# Patient Record
Sex: MALE | URBAN - METROPOLITAN AREA
[De-identification: names, ages, dates, MRNs, and addresses within clinical notes are randomized per-mention and may not be internally consistent; named-entity substitution may affect disease eponyms.]

---

## 2022-10-13 ENCOUNTER — OFFICE VISIT (OUTPATIENT)
Dept: FAMILY MEDICINE CLINIC | Age: 22
End: 2022-10-13
Payer: COMMERCIAL

## 2022-10-13 ENCOUNTER — NURSE TRIAGE (OUTPATIENT)
Dept: OTHER | Facility: CLINIC | Age: 22
End: 2022-10-13

## 2022-10-13 VITALS
OXYGEN SATURATION: 97 % | RESPIRATION RATE: 14 BRPM | SYSTOLIC BLOOD PRESSURE: 130 MMHG | HEIGHT: 78 IN | TEMPERATURE: 97.9 F | BODY MASS INDEX: 24.83 KG/M2 | HEART RATE: 85 BPM | DIASTOLIC BLOOD PRESSURE: 79 MMHG | WEIGHT: 214.6 LBS

## 2022-10-13 DIAGNOSIS — R53.83 FATIGUE, UNSPECIFIED TYPE: ICD-10-CM

## 2022-10-13 DIAGNOSIS — R20.2 NUMBNESS AND TINGLING IN BOTH HANDS: Primary | ICD-10-CM
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
## 2022-10-13 DIAGNOSIS — R20.0 NUMBNESS AND TINGLING IN BOTH HANDS: Primary | ICD-10-CM

## 2022-10-13 DIAGNOSIS — R20.2 NUMBNESS AND TINGLING OF BOTH LOWER EXTREMITIES: ICD-10-CM

## 2022-10-13 DIAGNOSIS — R20.0 NUMBNESS AND TINGLING OF BOTH LOWER EXTREMITIES: ICD-10-CM

## 2022-10-13 PROCEDURE — 99202 OFFICE O/P NEW SF 15 MIN: CPT | Performed by: FAMILY MEDICINE

## 2022-10-13 NOTE — TELEPHONE ENCOUNTER
Location of patient: [place]    Received call from [facility] at [facility] with [employer]; Patient with Red Flag Complaint requesting to establish care with John Garcia. Subjective: Caller states \"Tingling in hands and feet. It has been going on for a long time but hasn't had insurance. He saw someone when this and chest pain started and said it was stress. No chest pain. \"     Current Symptoms: Tingling in hands and feet    Onset: 6 months ago;     Associated Symptoms: NA    Pain Severity: 0/10; Temperature:  Denies Fever    What has been tried: Nothing    LMP: NA Pregnant: NA    Recommended disposition: See PCP within 3 Days    Care advice provided, patient verbalizes understanding; denies any other questions or concerns; instructed to call back for any new or worsening symptoms. Patient/Caller agrees with recommended disposition; writer provided warm transfer to Jose Johnson at [facility] for appointment scheduling    Attention Provider: Thank you for allowing me to participate in the care of your patient. The patient was connected to triage in response to information provided to the [facility]. Please do not respond through this encounter as the response is not directed to a shared pool.         Reason for Disposition   Numbness or tingling in one or both hands is a chronic symptom (recurrent or ongoing problem lasting > 4 weeks)    Protocols used: Neurologic Deficit-ADULT-[place]

## 2022-10-13 NOTE — PROGRESS NOTES
Vahid Grant is a 20 y.o. male here today to address the following issues:  Chief Complaint   Patient presents with    Tingling     Patient stated its been on going for a year in his hands and feet and the numbness has progressed up his arms and legs comes and goes. Onset:2-3 years ago  Symptoms: Bilateral hand and feet tinging  Alleviating Factors:nothing  Aggravating Factors:nothing  Hx of frequent tick bites     Denies personal or family history of autoimmune disease. Also denies any personal or family history of thyroid disease. Does complain of some fatigue. No past medical history on file. No past surgical history on file. Social History     Socioeconomic History    Marital status: Not on file     Spouse name: Not on file    Number of children: Not on file    Years of education: Not on file    Highest education level: Not on file   Occupational History    Not on file   Tobacco Use    Smoking status: Not on file    Smokeless tobacco: Not on file   Substance and Sexual Activity    Alcohol use: Not on file    Drug use: Not on file    Sexual activity: Not on file   Other Topics Concern    Not on file   Social History Narrative    Not on file     Social Determinants of Health     Financial Resource Strain: Not on file   Food Insecurity: Not on file   Transportation Needs: Not on file   Physical Activity: Not on file   Stress: Not on file   Social Connections: Not on file   Intimate Partner Violence: Not on file   Housing Stability: Not on file       Not on File    No current outpatient medications on file. No current facility-administered medications for this visit. ROS    Visit Vitals  /79 (BP 1 Location: Left upper arm, BP Patient Position: Sitting, BP Cuff Size: Adult)   Pulse 85   Temp 97.9 °F (36.6 °C)   Resp 14   Ht 6' 6\" (1.981 m)   Wt 214 lb 9.6 oz (97.3 kg)   SpO2 97%   BMI 24.80 kg/m²       Physical Exam  GEN: Well appearing. No apparent distress.   Responds to all questions appropriately. Lungs: No labored respirations. Talking in complete sentences without difficulty. Neuro/Vascular: No edema  Skin: No obvious rash or skin breakdown     No results found for this or any previous visit (from the past 12 hour(s)). 1. Numbness and tingling in both hands  - METABOLIC PANEL, COMPREHENSIVE; Future  - CBC WITH AUTOMATED DIFF; Future  - VITAMIN B12; Future  - TSH 3RD GENERATION; Future  - SED RATE (ESR); Future  - C REACTIVE PROTEIN, QT; Future  - LYME AB, IGG & IGM BY WB; Future    2. Numbness and tingling of both lower extremities  - METABOLIC PANEL, COMPREHENSIVE; Future  - CBC WITH AUTOMATED DIFF; Future  - VITAMIN B12; Future  - TSH 3RD GENERATION; Future  - SED RATE (ESR); Future  - C REACTIVE PROTEIN, QT; Future  - LYME AB, IGG & IGM BY WB; Future    3. Tick bite, unspecified site, initial encounter  - METABOLIC PANEL, COMPREHENSIVE; Future  - CBC WITH AUTOMATED DIFF; Future  - VITAMIN B12; Future  - TSH 3RD GENERATION; Future  - SED RATE (ESR); Future  - C REACTIVE PROTEIN, QT; Future  - LYME AB, IGG & IGM BY WB; Future    4. Fatigue, unspecified type  - METABOLIC PANEL, COMPREHENSIVE; Future  - CBC WITH AUTOMATED DIFF; Future  - VITAMIN B12; Future  - TSH 3RD GENERATION; Future  - SED RATE (ESR); Future  - C REACTIVE PROTEIN, QT; Future  - LYME AB, IGG & IGM BY WB; Future    We will get labs. Can hold off until his annual wellness if he wants or can make a lab appointment for this. We need to establish care as this is his first visit to our clinic. Follow-up and Dispositions    Return in about 2 weeks (around 10/27/2022) for Annual Wellness with green team resident .            Darin Roa MD, FAAFP, CAQSM, RMSK

## 2022-10-28 ENCOUNTER — OFFICE VISIT (OUTPATIENT)
Dept: FAMILY MEDICINE CLINIC | Age: 22
End: 2022-10-28
Payer: COMMERCIAL

## 2022-10-28 VITALS
RESPIRATION RATE: 16 BRPM | DIASTOLIC BLOOD PRESSURE: 77 MMHG | BODY MASS INDEX: 25.45 KG/M2 | OXYGEN SATURATION: 98 % | HEIGHT: 78 IN | HEART RATE: 99 BPM | SYSTOLIC BLOOD PRESSURE: 146 MMHG | WEIGHT: 220 LBS

## 2022-10-28 DIAGNOSIS — R20.2 NUMBNESS AND TINGLING IN BOTH HANDS: ICD-10-CM

## 2022-10-28 DIAGNOSIS — Z00.00 ANNUAL PHYSICAL EXAM: Primary | ICD-10-CM
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
## 2022-10-28 DIAGNOSIS — R20.0 NUMBNESS AND TINGLING OF BOTH LOWER EXTREMITIES: ICD-10-CM

## 2022-10-28 DIAGNOSIS — R20.0 NUMBNESS AND TINGLING IN BOTH HANDS: ICD-10-CM

## 2022-10-28 DIAGNOSIS — R20.2 NUMBNESS AND TINGLING OF BOTH LOWER EXTREMITIES: ICD-10-CM

## 2022-10-28 DIAGNOSIS — R53.83 FATIGUE, UNSPECIFIED TYPE: ICD-10-CM

## 2022-10-28 DIAGNOSIS — F41.9 ANXIETY: ICD-10-CM

## 2022-10-28 PROCEDURE — 99395 PREV VISIT EST AGE 18-39: CPT

## 2022-10-28 RX ORDER — SERTRALINE HYDROCHLORIDE 25 MG/1
25 TABLET, FILM COATED ORAL DAILY
Qty: 30 TABLET | Refills: 0 | Status: SHIPPED | OUTPATIENT
Start: 2022-10-28 | End: 2022-11-25

## 2022-10-28 NOTE — PROGRESS NOTES
Bhavna Webb is a 20 y.o. male    Chief Complaint   Patient presents with    Complete Physical       1. Have you been to the ER, urgent care clinic since your last visit? Hospitalized since your last visit? No  2. Have you seen or consulted any other health care providers outside of the [address] [place] [address] since your last visit? Include any pap smears or colon screening. No    Visit Vitals  BP (!) 146/77 (BP 1 Location: Left upper arm, BP Patient Position: Sitting)   Pulse 99   Resp 16   Ht 6' 6\" (1.981 m)   Wt 220 lb (99.8 kg)   SpO2 98%   BMI 25.42 kg/m²     No flowsheet data found.   Health Maintenance Due   Topic Date Due    Hepatitis C Screening  Never done    Depression Screen  Never done    COVID-19 Vaccine (1) Never done    DTaP/Tdap/Td series (1 - Tdap) Never done    Flu Vaccine (1) Never done

## 2022-10-28 NOTE — PROGRESS NOTES
Subjective:   Leonarda Gaffney is an 20 y.o. male who presents for complete physical exam.    Patient was seen two weeks ago due to numbness and tingling which the patient has been experiencing in his hands and feet since past 2 years. Patient denies any muscle weakness, muscle pain, any rash, fever/chills. Patient also endorses pain in his knees that comes and goes. Patient says he has a tendency for his lower extremities to twitch sometimes. Patient has also noticed his short term memory has been worse since the past 6 months to 1 year ago. 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 Patient says his symptoms does not affect his work. Patient mentions he is very anxious about getting his lab work done. Patient was seen 2 years ago for these symptoms where they suspected Lyme disease but held off on labs as patient has a fear of getting his blood drawn. No fear of needles as patient has gotten tattoos. Diet: frozen breakfast burritos, frozen food, ground beef, pasta   Exercise: stays active at work    Immunizations - reviewed: There is no immunization history on file for this patient. Flu: Deferred  Tdap: Patient received it 4-5 years ago    Health Maintenance reviewed -  HIV testing: today  Hepatitis C testing: Due today    Allergies - reviewed:   No Known Allergies      Medications - reviewed:  Current Outpatient Medications   Medication Sig    sertraline (ZOLOFT) 25 mg tablet Take 1 Tablet by mouth daily. Indications: repeated episodes of anxiety     No current facility-administered medications for this visit. Past Medical History - reviewed:  No past medical history on file. Past Surgical History - reviewed:  No past surgical history on file. Family History - reviewed:  No family history on file.       Social History - reviewed:  Social History     Socioeconomic History    Marital status: SINGLE     Spouse name: Not on file    Number of children: Not on file    Years of education: Not on file    Highest education level: Not on file   Occupational History    Not on file   Tobacco Use    Smoking status: Not on file    Smokeless tobacco: Not on file   Substance and Sexual Activity    Alcohol use: Not on file    Drug use: Not on file    Sexual activity: Not on file   Other Topics Concern    Not on file   Social History Narrative    Not on file     Social Determinants of Health     Financial Resource Strain: Not on file   Food Insecurity: Not on file   Transportation Needs: Not on file   Physical Activity: Not on file   Stress: Not on file   Social Connections: Not on file   Intimate Partner Violence: Not on file   Housing Stability: Not on file         Review of Systems   CONSTITUTIONAL: Denies: fever, chills  PSYCH: Patient believes he may have anxiety      Objective:   Visit Vitals  BP (!) (P) 146/77 (BP 1 Location: Left upper arm, BP Patient Position: Sitting) Comment: pt very nervous about labwork   Pulse 99   Resp 16   Ht 6' 6\" (1.981 m)   Wt 220 lb (99.8 kg)   SpO2 98%   BMI 25.42 kg/m²       General appearance - alert, well appearing  Eyes - sclera anicteric and without injection   Chest - clear to auscultation  Heart - normal rate, regular rhythm, normal S1, S2, no murmurs  Abdomen - soft, nontender, nondistended, no masses or organomegaly  Neurological - alert, oriented, normal speech, no issues with sensation in the upper and lower extremities, myoclonus noted mainly in the LLE  Musculoskeletal - no joint tenderness, deformity or swelling  Extremities - no pedal edema noted  Skin - normal coloration and turgor, no rashes, no suspicious skin lesions noted      Assessment:       ICD-10-CM ICD-9-CM    1. Annual physical exam  Z00.00 V70.0 HEPATITIS C AB      HIV 1/2 AG/AB, 4TH GENERATION,W RFLX CONFIRM      HIV 1/2 AG/AB, 4TH GENERATION,W RFLX CONFIRM      HEPATITIS C AB      2.  Numbness and tingling in both hands R20.0 782.0 LYME AB, IGG & IGM BY WB    R20.2  C REACTIVE PROTEIN, QT      SED RATE (ESR)      TSH 3RD GENERATION      VITAMIN B12      CBC WITH AUTOMATED DIFF      METABOLIC PANEL, COMPREHENSIVE      3. Numbness and tingling of both lower extremities  R20.0 782.0 LYME AB, IGG & IGM BY WB    R20.2  C REACTIVE PROTEIN, QT      SED RATE (ESR)      TSH 3RD GENERATION      VITAMIN B12      CBC WITH AUTOMATED DIFF      METABOLIC PANEL, COMPREHENSIVE      4. Tick bite, unspecified site, initial encounter  W57. XXXA 919.4 LYME AB, IGG & IGM BY WB     E906.4 C REACTIVE PROTEIN, QT      SED RATE (ESR)      TSH 3RD GENERATION      VITAMIN B12      CBC WITH AUTOMATED DIFF      METABOLIC PANEL, COMPREHENSIVE      5. Fatigue, unspecified type  R53.83 780.79 LYME AB, IGG & IGM BY WB      C REACTIVE PROTEIN, QT      SED RATE (ESR)      TSH 3RD GENERATION      VITAMIN B12      CBC WITH AUTOMATED DIFF      METABOLIC PANEL, COMPREHENSIVE      6. Anxiety  F41.9 300.00 sertraline (ZOLOFT) 25 mg tablet              Plan:   Dio Arellano is a 20year old male with PMHx of depression who presents for an annual physical exam.    1. Annual physical exam  - HIV 1/2 AG/AB, 4TH GENERATION,W RFLX CONFIRM  - HEPATITIS C AB    2. Numbness and tingling in both hands and bilateral LE  - LYME AB, IGG & IGM BY WB  - C REACTIVE PROTEIN, QT  - SED RATE (ESR)  - TSH 3RD GENERATION  - VITAMIN B12  - CBC WITH AUTOMATED DIFF  - METABOLIC PANEL, COMPREHENSIVE    3. Tick bite, unspecified site, initial encounter  - LYME AB, IGG & IGM BY WB  - C REACTIVE PROTEIN, QT  - SED RATE (ESR)  - TSH 3RD GENERATION  - VITAMIN B12  - CBC WITH AUTOMATED DIFF  - METABOLIC PANEL, COMPREHENSIVE    4. Fatigue, unspecified type  - LYME AB, IGG & IGM BY WB  - C REACTIVE PROTEIN, QT  - SED RATE (ESR)  - TSH 3RD GENERATION  - VITAMIN B12  - CBC WITH AUTOMATED DIFF  - METABOLIC PANEL, COMPREHENSIVE    5. Anxiety: SEBASTIAN-7 score: 9. PHQ9 score: 9.  History of depression when patient was 14years old. Patient anxious about getting blood drawn for labs with one episode of emesis. - sertraline (ZOLOFT) 25 mg tablet; Take 1 Tablet by mouth daily. Indications: repeated episodes of anxiety  Dispense: 30 Tablet; Refill: 0   - Follow up in 2 weeks    6. Elevated blood pressure: BP today: 146/77. Likely due to anxiety about getting blood drawn for labs vs white coat HTN as patient had normal BP on 10/13 clinic visit. - Follow up on BP at next visit in 2 weeks. I have discussed the diagnosis with the patient and the intended plan as seen in the above orders. The patient has received an after-visit summary and questions were answered concerning future plans. I have discussed medication side effects and warnings with the patient as well. Informed pt to return to the office if new symptoms arise.     Patient discussed with Dr. Omid Silver (Attending)     Judy Phelps MD  Family Medicine Resident (PGY-1)

## 2022-11-02 LAB
ALBUMIN SERPL-MCNC: 5.3 G/DL (ref 4.1–5.2)
ALBUMIN/GLOB SERPL: 2 {RATIO} (ref 1.2–2.2)
ALP SERPL-CCNC: 98 IU/L (ref 44–121)
ALT SERPL-CCNC: 24 IU/L (ref 0–44)
AST SERPL-CCNC: 24 IU/L (ref 0–40)
B BURGDOR IGG PATRN SER IB-IMP: NEGATIVE
B BURGDOR IGM PATRN SER IB-IMP: NEGATIVE
B BURGDOR18KD IGG SER QL IB: ABNORMAL
B BURGDOR23KD IGG SER QL IB: ABNORMAL
B BURGDOR23KD IGM SER QL IB: PRESENT
B BURGDOR28KD IGG SER QL IB: PRESENT
B BURGDOR30KD IGG SER QL IB: ABNORMAL
B BURGDOR39KD IGG SER QL IB: PRESENT
B BURGDOR39KD IGM SER QL IB: ABNORMAL
B BURGDOR41KD IGG SER QL IB: ABNORMAL
B BURGDOR41KD IGM SER QL IB: ABNORMAL
B BURGDOR45KD IGG SER QL IB: ABNORMAL
B BURGDOR58KD IGG SER QL IB: PRESENT
B BURGDOR66KD IGG SER QL IB: ABNORMAL
B BURGDOR93KD IGG SER QL IB: ABNORMAL
BASOPHILS # BLD AUTO: 0 X10E3/UL (ref 0–0.2)
BASOPHILS NFR BLD AUTO: 0 %
BILIRUB SERPL-MCNC: 1.1 MG/DL (ref 0–1.2)
BUN SERPL-MCNC: 9 MG/DL (ref 6–20)
BUN/CREAT SERPL: 8 (ref 9–20)
CALCIUM SERPL-MCNC: 10.2 MG/DL (ref 8.7–10.2)
CHLORIDE SERPL-SCNC: 100 MMOL/L (ref 96–106)
CO2 SERPL-SCNC: 24 MMOL/L (ref 20–29)
CREAT SERPL-MCNC: 1.1 MG/DL (ref 0.76–1.27)
CRP SERPL-MCNC: 1 MG/L (ref 0–10)
EGFR: 97 ML/MIN/1.73
EOSINOPHIL # BLD AUTO: 0.1 X10E3/UL (ref 0–0.4)
EOSINOPHIL NFR BLD AUTO: 1 %
ERYTHROCYTE [DISTWIDTH] IN BLOOD BY AUTOMATED COUNT: 12.5 % (ref 11.6–15.4)
ERYTHROCYTE [SEDIMENTATION RATE] IN BLOOD BY WESTERGREN METHOD: 2 MM/HR (ref 0–15)
GLOBULIN SER CALC-MCNC: 2.6 G/DL (ref 1.5–4.5)
GLUCOSE SERPL-MCNC: 89 MG/DL (ref 70–99)
HCT VFR BLD AUTO: 46.4 % (ref 37.5–51)
HCV AB S/CO SERPL IA: <0.1 S/CO RATIO (ref 0–0.9)
HGB BLD-MCNC: 15.7 G/DL (ref 13–17.7)
HIV 1+2 AB+HIV1 P24 AG SERPL QL IA: NON REACTIVE
IMM GRANULOCYTES # BLD AUTO: 0 X10E3/UL (ref 0–0.1)
IMM GRANULOCYTES NFR BLD AUTO: 0 %
LYMPHOCYTES # BLD AUTO: 1.8 X10E3/UL (ref 0.7–3.1)
LYMPHOCYTES NFR BLD AUTO: 27 %
MCH RBC QN AUTO: 28.8 PG (ref 26.6–33)
MCHC RBC AUTO-ENTMCNC: 33.8 G/DL (ref 31.5–35.7)
MCV RBC AUTO: 85 FL (ref 79–97)
MONOCYTES # BLD AUTO: 0.6 X10E3/UL (ref 0.1–0.9)
MONOCYTES NFR BLD AUTO: 9 %
NEUTROPHILS # BLD AUTO: 4.2 X10E3/UL (ref 1.4–7)
NEUTROPHILS NFR BLD AUTO: 63 %
PLATELET # BLD AUTO: 184 X10E3/UL (ref 150–450)
POTASSIUM SERPL-SCNC: 4 MMOL/L (ref 3.5–5.2)
PROT SERPL-MCNC: 7.9 G/DL (ref 6–8.5)
RBC # BLD AUTO: 5.45 X10E6/UL (ref 4.14–5.8)
SODIUM SERPL-SCNC: 139 MMOL/L (ref 134–144)
TSH SERPL DL<=0.005 MIU/L-ACNC: 4.18 UIU/ML (ref 0.45–4.5)
VIT B12 SERPL-MCNC: 347 PG/ML (ref 232–1245)
WBC # BLD AUTO: 6.7 X10E3/UL (ref 3.4–10.8)
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
## 2022-11-08 RX ORDER — DOXYCYCLINE 100 MG/1
100 CAPSULE ORAL 2 TIMES DAILY
Qty: 56 CAPSULE | Refills: 0 | Status: SHIPPED | OUTPATIENT
Start: 2022-11-08 | End: 2022-12-06

## 2022-11-08 NOTE — PROGRESS NOTES
Called and talked to the patient about starting Doxycycline 100 mg BID for 28 days for suspected Lyme disease with neurologic findings though Western Blot negative. I have informed patient of the same. Patient will need to be seen after finishing the course of antibiotics to see whether it has improved his symptoms. If not, will need to look into other causes.

## 2022-11-15 ENCOUNTER — OFFICE VISIT (OUTPATIENT)
Dept: FAMILY MEDICINE CLINIC | Age: 22
End: 2022-11-15
Payer: COMMERCIAL

## 2022-11-15 VITALS
BODY MASS INDEX: 25.19 KG/M2 | DIASTOLIC BLOOD PRESSURE: 85 MMHG | SYSTOLIC BLOOD PRESSURE: 135 MMHG | WEIGHT: 218 LBS | HEART RATE: 73 BPM | OXYGEN SATURATION: 99 %

## 2022-11-15 DIAGNOSIS — F41.9 ANXIETY: Primary | ICD-10-CM

## 2022-11-15 DIAGNOSIS — R03.0 ELEVATED BP WITHOUT DIAGNOSIS OF HYPERTENSION: ICD-10-CM

## 2022-11-15 NOTE — PROGRESS NOTES
Chief Complaint   Patient presents with    Medication Evaluation     Check on anxiety meds     Subjective   Breanna Hugo is an 20 y.o. male who presents for anxiety follow up. Recently started on Zoloft 25mg. This is working okay for him. Some days are better than others. No side effects from medication. Not interested in therapy at this time. SEBASTIAN 7 score 3 today    Triggers: going to public places, talking to people he doesn't know. No recent panic attacks     Unsure of family hx of HTN. BP elevated again today. Patient says it is always elevated at doctor's offices. Has BP cuff at home. Recent lyme dx, being treated with doxycycline. Has some persistent numbness/tingling. No recent fevers/chills, n/v/c/d, abd pain, vision changes or facial droop. Review of Systems   Comprehensive review of systems was negative except what is mentioned in HPI. Allergies   No Known Allergies    Medications  Current Outpatient Medications   Medication Sig    sertraline (ZOLOFT) 25 mg tablet Take 1 Tablet by mouth daily. Indications: repeated episodes of anxiety    doxycycline (VIBRAMYCIN) 100 mg capsule Take 1 Capsule by mouth two (2) times a day for 28 days. Indications: Lyme disease with late neurological findings (Patient not taking: Reported on 11/15/2022)     No current facility-administered medications for this visit. Medical History  Past Medical History:   Diagnosis Date    Anxiety        Immunizations     There is no immunization history on file for this patient. Objective   Vital Signs  Visit Vitals  /85   Pulse 73   Wt 218 lb (98.9 kg)   SpO2 99%   BMI 25.19 kg/m²       Physical Examination  Physical Exam  Constitutional:       Appearance: Normal appearance. Cardiovascular:      Rate and Rhythm: Normal rate and regular rhythm. Pulses: Normal pulses. Heart sounds: Normal heart sounds. Pulmonary:      Effort: Pulmonary effort is normal.      Breath sounds: Normal breath sounds. Musculoskeletal:      Cervical back: Normal range of motion and neck supple. Neurological:      Mental Status: He is alert. Comments: Twitching noted        Assessment and Plan   Leonarda Gulshan is a 20 y.o. who presents for anxiety follow up    1. Anxiety  Continue Zoloft 25mg daily. Re-evaluate in 4wk to see if dose increase is needed. Currently symptoms well controlled. SEBASTIAN 7 score 3. No SI/HI. 2. Elevated BP without diagnosis of hypertension  BP initially 145/83, improved to 135/85 on repeat. Patient advised to maintain BP log and follow up in 1 week. May need further workup with aldosterone/renin, urine metanephrines, lipid panel if home BP also elevated. Follow-up and Dispositions    Return in about 1 week (around 11/22/2022) for BP follow up (virtual visit). I have discussed the aforementioned diagnoses and plan with the patient in detail. I have provided information in person and/or in AVS. All questions answered prior to discharge.       I discussed this patient with Dr. Gilda Nevarez (Attending Physician)   Signed By:  Igor Schultz DO    Family Medicine Resident

## 2022-11-15 NOTE — PROGRESS NOTES
Chief Complaint   Patient presents with    Medication Evaluation     Check on anxiety meds     Visit Vitals  BP (!) 145/83 (BP 1 Location: Right arm, BP Patient Position: Sitting, BP Cuff Size: Adult long)   Pulse (!) 114   Wt 218 lb (98.9 kg)   SpO2 99%   BMI 25.19 kg/m²

## 2022-11-22 ENCOUNTER — TELEPHONE (OUTPATIENT)
Dept: FAMILY MEDICINE CLINIC | Age: 22
End: 2022-11-22

## 2022-11-22 NOTE — TELEPHONE ENCOUNTER
Multiple attempts made to contact pt for their VV scheduled on 11/22 at 3:30pm including via doxy. me link and via telephone w/ no response. LVM advising pt to call and reschedule at their earliest convenience if desired.     Ness Hummel MD

## 2022-11-25 DIAGNOSIS — F41.9 ANXIETY: ICD-10-CM

## 2022-11-25 RX ORDER — SERTRALINE HYDROCHLORIDE 25 MG/1
25 TABLET, FILM COATED ORAL DAILY
Qty: 30 TABLET | Refills: 0 | Status: SHIPPED | OUTPATIENT
Start: 2022-11-25

## 2022-12-09 ENCOUNTER — OFFICE VISIT (OUTPATIENT)
Dept: FAMILY MEDICINE CLINIC | Age: 22
End: 2022-12-09
Payer: COMMERCIAL

## 2022-12-09 VITALS
OXYGEN SATURATION: 98 % | HEIGHT: 78 IN | RESPIRATION RATE: 14 BRPM | SYSTOLIC BLOOD PRESSURE: 133 MMHG | HEART RATE: 82 BPM | TEMPERATURE: 97.6 F | DIASTOLIC BLOOD PRESSURE: 84 MMHG | BODY MASS INDEX: 25.2 KG/M2 | WEIGHT: 217.8 LBS

## 2022-12-09 DIAGNOSIS — R03.0 ELEVATED BP WITHOUT DIAGNOSIS OF HYPERTENSION: ICD-10-CM

## 2022-12-09 DIAGNOSIS — F41.9 ANXIETY: Primary | ICD-10-CM
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
## 2022-12-09 RX ORDER — DOXYCYCLINE 100 MG/1
100 CAPSULE ORAL 2 TIMES DAILY
COMMUNITY

## 2022-12-09 RX ORDER — SERTRALINE HYDROCHLORIDE 25 MG/1
50 TABLET, FILM COATED ORAL DAILY
Qty: 60 TABLET | Refills: 2 | Status: SHIPPED | OUTPATIENT
Start: 2022-12-09

## 2022-12-09 NOTE — PROGRESS NOTES
Terrance Fox is a 20 y.o. male    Chief Complaint   Patient presents with    Medication Evaluation       1. Have you been to the ER, urgent care clinic since your last visit? Hospitalized since your last visit? No  2. Have you seen or consulted any other health care providers outside of the [address] [place] [address] since your last visit? Include any pap smears or colon screening. No    There were no vitals taken for this visit.   3 most recent PHQ Screens 12/9/2022   Little interest or pleasure in doing things Not at all   Feeling down, depressed, irritable, or hopeless Not at all   Total Score PHQ 2 0   Trouble falling or staying asleep, or sleeping too much -   Feeling tired or having little energy -   Poor appetite, weight loss, or overeating -   Feeling bad about yourself - or that you are a failure or have let yourself or your family down -   Trouble concentrating on things such as school, work, reading, or watching TV -   Moving or speaking so slowly that other people could have noticed; or the opposite being so fidgety that others notice -   Thoughts of being better off dead, or hurting yourself in some way -   PHQ 9 Score -   How difficult have these problems made it for you to do your work, take care of your home and get along with others -     Health Maintenance Due   Topic Date Due    COVID-19 Vaccine (1) Never done    DTaP/Tdap/Td series (1 - Tdap) Never done

## 2022-12-09 NOTE — PROGRESS NOTES
[facility] [address] [place] [address]   Office [phone], Fax [phone]    Subjective:     Chief Complaint   Patient presents with    Medication Evaluation       HPI:  Toño Clark is a 20 y.o. male with a history of Anxiety that presents for: follow up for Anxiety and positive lyme initial testing    Lyme Disease:   - symptoms initially noted with peripheral neuropathy, initial testing positive however western blot negative. - treated with Doxy x28 days for concern for neurologic symptoms  - feels neuropathy is improving since last visit     Anxiety:   - Prescribed Zoloft 25mg  -Feels like his symptoms generally have been slightly worsening  -Denies any SI or HI        Health Maintenance:  Health Maintenance Due   Topic Date Due    COVID-19 Vaccine (1) Never done    DTaP/Tdap/Td series (1 - Tdap) Never done          Past Medical Hx  I personally reviewed. Past Medical History:   Diagnosis Date    Anxiety         SocHx   I personally reviewed. Social History     Socioeconomic History    Marital status: SINGLE     Spouse name: Not on file    Number of children: Not on file    Years of education: Not on file    Highest education level: Not on file   Occupational History    Not on file   Tobacco Use    Smoking status: Never    Smokeless tobacco: Never   Substance and Sexual Activity    Alcohol use: Not Currently    Drug use: Never    Sexual activity: Not on file   Other Topics Concern    Not on file   Social History Narrative    Not on file     Social Determinants of Health     Financial Resource Strain: Not on file   Food Insecurity: Not on file   Transportation Needs: Not on file   Physical Activity: Not on file   Stress: Not on file   Social Connections: Not on file   Intimate Partner Violence: Not on file   Housing Stability: Not on file        Allergies  I personally reviewed. No Known Allergies     Medications  I personally reviewed.   Current Outpatient Medications on File Prior to Visit Medication Sig Dispense Refill    doxycycline (MONODOX) 100 mg capsule Take 100 mg by mouth two (2) times a day. No current facility-administered medications on file prior to visit. ROS: as listed in HPI        Objective:   Vitals  I personally reviewed. Visit Vitals  /84 (BP 1 Location: Right arm, BP Patient Position: Sitting, BP Cuff Size: Large adult)   Pulse 82   Temp 97.6 °F (36.4 °C)   Resp 14   Ht 6' 6\" (1.981 m)   Wt 217 lb 12.8 oz (98.8 kg)   SpO2 98%   BMI 25.17 kg/m²        Physical Exam:   Physical Exam  Constitutional:       Appearance: Normal appearance. HENT:      Head: Normocephalic and atraumatic. Right Ear: External ear normal.      Left Ear: External ear normal.   Cardiovascular:      Rate and Rhythm: Normal rate and regular rhythm. Pulses: Normal pulses. Heart sounds: Normal heart sounds. No murmur heard. Pulmonary:      Effort: Pulmonary effort is normal. No respiratory distress. Breath sounds: Normal breath sounds. No stridor. No wheezing. Abdominal:      General: Abdomen is flat. Palpations: Abdomen is soft. Tenderness: There is no abdominal tenderness. Skin:     General: Skin is warm and dry. Findings: No rash. Neurological:      General: No focal deficit present. Mental Status: He is alert and oriented to person, place, and time. GAD7 Score: 6 today   Assessment/Plan:       76-year-old male with history of anxiety comes in today to follow-up for increased anxiety as well as recent treatment for positive Lyme testing. Patient has completed 28 days of doxycycline with peripheral neuropathy slightly improving at this time. EKG performed today for baseline cardiac, noted to be normal.    Patient notes increasing anxiety, with no real improvement from 25 mg of Zoloft. Get score of 6 today. Will increase Zoloft dose to 50 daily and follow-up in 1 month. Diagnoses and all orders for this visit:    1. Anxiety    2. Elevated BP without diagnosis of hypertension    3. Tick bite, unspecified site, initial encounter       Follow-up and Dispositions    Return in about 4 weeks (around 1/6/2023) for Follow Up Anxiety, Increased Zoloft . Pt was discussed with Dr Jerzy Carlos (attending physician). I have reviewed patient medical and social history and medications. I have reviewed pertinent labs results and other data. I have discussed the diagnosis with the patient and the intended plan as seen in the above orders. The patient has received an after-visit summary and questions were answered concerning future plans. I have discussed medication side effects and warnings with the patient as well.     Katelynn Contreras MD  Resident TELECARE [facility]

## 2023-01-03 ENCOUNTER — OFFICE VISIT (OUTPATIENT)
Dept: FAMILY MEDICINE CLINIC | Age: 23
End: 2023-01-03
Payer: COMMERCIAL

## 2023-01-03 VITALS
HEIGHT: 78 IN | DIASTOLIC BLOOD PRESSURE: 75 MMHG | OXYGEN SATURATION: 96 % | SYSTOLIC BLOOD PRESSURE: 135 MMHG | BODY MASS INDEX: 24.99 KG/M2 | RESPIRATION RATE: 17 BRPM | HEART RATE: 99 BPM | TEMPERATURE: 98 F | WEIGHT: 216 LBS

## 2023-01-03 DIAGNOSIS — F41.9 ANXIETY: Primary | ICD-10-CM

## 2023-01-03 RX ORDER — SERTRALINE HYDROCHLORIDE 50 MG/1
50 TABLET, FILM COATED ORAL DAILY
Qty: 60 TABLET | Refills: 1 | Status: SHIPPED | OUTPATIENT
Start: 2023-01-03

## 2023-01-03 NOTE — LETTER
NOTIFICATION RETURN TO WORK / SCHOOL    1/3/2023 3:17 PM    Mr. Fior Butts  [id number]      To Whom It May Concern:    Fior Butts is currently under the care of [id number] [email or web address]. He will return to work  on: 1/4/22    If there are questions or concerns please have the patient contact our office.         Sincerely,      Екатерина Reyes MD

## 2023-01-03 NOTE — PROGRESS NOTES
[address] [facility] [address] [place] [address]   Office [phone], Fax [phone]    Subjective:     Chief Complaint   Patient presents with    Anxiety       HPI:  Jesus Galloway is a 20 y.o. male with a history of anxiety that presents for: follow up increasing Zoloft     Anxiety:   Recently increased Zoloft to 50mg daily as patient was complaining of no improvement in symptoms at last visit. GAD7 score of 6 at that time. Today, after medication increase, patient states significant improvement in symptoms. States he he sleeping better and has less general anxiety. No panic attacks. Denies any side effects. Health Maintenance:  Health Maintenance Due   Topic Date Due    COVID-19 Vaccine (1) Never done    DTaP/Tdap/Td series (1 - Tdap) Never done          Past Medical Hx  I personally reviewed. Past Medical History:   Diagnosis Date    Anxiety         SocHx   I personally reviewed. Social History     Socioeconomic History    Marital status: SINGLE     Spouse name: Not on file    Number of children: Not on file    Years of education: Not on file    Highest education level: Not on file   Occupational History    Not on file   Tobacco Use    Smoking status: Never    Smokeless tobacco: Never   Substance and Sexual Activity    Alcohol use: Not Currently    Drug use: Never    Sexual activity: Not on file   Other Topics Concern    Not on file   Social History Narrative    Not on file     Social Determinants of Health     Financial Resource Strain: Not on file   Food Insecurity: Not on file   Transportation Needs: Not on file   Physical Activity: Not on file   Stress: Not on file   Social Connections: Not on file   Intimate Partner Violence: Not on file   Housing Stability: Not on file        Allergies  I personally reviewed. No Known Allergies     Medications  I personally reviewed.   Current Outpatient Medications on File Prior to Visit   Medication Sig Dispense Refill    sertraline (ZOLOFT) 25 mg tablet Take 2 Tablets by mouth daily. Indications: repeated episodes of anxiety 60 Tablet 2    doxycycline (MONODOX) 100 mg capsule Take 100 mg by mouth two (2) times a day. (Patient not taking: Reported on 1/3/2023)       No current facility-administered medications on file prior to visit. ROS:   Review of Systems   Neurological:  Negative for dizziness. Psychiatric/Behavioral:  Negative for depression, substance abuse and suicidal ideas. The patient is nervous/anxious. Objective:   Vitals  I personally reviewed. Visit Vitals  /75   Pulse 99   Temp 98 °F (36.7 °C) (Temporal)   Resp 17   Ht 6' 6\" (1.981 m)   Wt 216 lb (98 kg)   SpO2 96%   BMI 24.96 kg/m²        Physical Exam:   Physical Exam  Constitutional:       Appearance: Normal appearance. Cardiovascular:      Rate and Rhythm: Normal rate and regular rhythm. Pulses: Normal pulses. Heart sounds: Normal heart sounds. Pulmonary:      Effort: Pulmonary effort is normal. No respiratory distress. Breath sounds: Normal breath sounds. No wheezing. Neurological:      General: No focal deficit present. Mental Status: He is alert and oriented to person, place, and time. Psychiatric:         Mood and Affect: Mood normal.         Behavior: Behavior normal.        GAD7 Score - 1     Notable Labs and Imaging:   None    Assessment/Plan:   Assessment:   20yo male following up for anxiety, improved after increasing Zoloft last month. 1. Anxiety  Symptoms significantly improved per patient after increasing Zoloft from 25mg to 50mg daily. GAD7 Score reduced to 1 today from previous 6. Patient is not interested in therapy at this time. May consider at next visit   Follow up in 3 months for general anxiety. Pt was discussed with Dr Hallie Mg (attending physician). I have reviewed patient medical and social history and medications. I have reviewed pertinent labs results and other data.  I have discussed the diagnosis with the patient and the intended plan as seen in the above orders. The patient has received an after-visit summary and questions were answered concerning future plans. I have discussed medication side effects and warnings with the patient as well.     Esther Lopez MD  Resident [place] [id number]

## 2023-01-03 NOTE — PROGRESS NOTES
Chief Complaint   Patient presents with    Anxiety     1. Have you been to the ER, urgent care clinic since your last visit? Hospitalized since your last visit? No    2. Have you seen or consulted any other health care providers outside of the [address] [place] [address] since your last visit? Include any pap smears or colon screening.  No

## 2023-01-09 ENCOUNTER — APPOINTMENT (OUTPATIENT)
Dept: GENERAL RADIOLOGY | Age: 23
End: 2023-01-09
Attending: STUDENT IN AN ORGANIZED HEALTH CARE EDUCATION/TRAINING PROGRAM
Payer: COMMERCIAL

## 2023-01-09 ENCOUNTER — HOSPITAL ENCOUNTER (EMERGENCY)
Age: 23
Discharge: HOME | End: 2023-01-09
Attending: STUDENT IN AN ORGANIZED HEALTH CARE EDUCATION/TRAINING PROGRAM
Payer: COMMERCIAL

## 2023-01-09 VITALS
BODY MASS INDEX: 24.99 KG/M2 | WEIGHT: 216 LBS | HEIGHT: 78 IN | TEMPERATURE: 98.5 F | OXYGEN SATURATION: 99 % | DIASTOLIC BLOOD PRESSURE: 83 MMHG | HEART RATE: 71 BPM | RESPIRATION RATE: 18 BRPM | SYSTOLIC BLOOD PRESSURE: 125 MMHG

## 2023-01-09 DIAGNOSIS — R07.89 CHEST WALL PAIN: Primary | ICD-10-CM

## 2023-01-09 LAB
ALBUMIN SERPL-MCNC: 4.3 G/DL (ref 3.5–5)
ALBUMIN/GLOB SERPL: 1.3 (ref 1.1–2.2)
ALP SERPL-CCNC: 82 U/L (ref 45–117)
ALT SERPL-CCNC: 37 U/L (ref 12–78)
ANION GAP SERPL CALC-SCNC: 6 MMOL/L (ref 5–15)
AST SERPL W P-5'-P-CCNC: 21 U/L (ref 15–37)
ATRIAL RATE: 122 BPM
BASOPHILS # BLD: 0 K/UL (ref 0–0.1)
BASOPHILS NFR BLD: 0 % (ref 0–1)
BILIRUB SERPL-MCNC: 1.6 MG/DL (ref 0.2–1)
BUN SERPL-MCNC: 9 MG/DL (ref 6–20)
BUN/CREAT SERPL: 8 (ref 12–20)
CA-I BLD-MCNC: 9.4 MG/DL (ref 8.5–10.1)
CALCULATED P AXIS, ECG09: 70 DEGREES
CALCULATED R AXIS, ECG10: 84 DEGREES
CALCULATED T AXIS, ECG11: 55 DEGREES
CHLORIDE SERPL-SCNC: 104 MMOL/L (ref 97–108)
CO2 SERPL-SCNC: 28 MMOL/L (ref 21–32)
CREAT SERPL-MCNC: 1.2 MG/DL (ref 0.7–1.3)
DIAGNOSIS, 93000: NORMAL
DIFFERENTIAL METHOD BLD: NORMAL
EOSINOPHIL # BLD: 0.1 K/UL (ref 0–0.4)
EOSINOPHIL NFR BLD: 1 % (ref 0–7)
ERYTHROCYTE [DISTWIDTH] IN BLOOD BY AUTOMATED COUNT: 11.6 % (ref 11.5–14.5)
GLOBULIN SER CALC-MCNC: 3.4 G/DL (ref 2–4)
GLUCOSE SERPL-MCNC: 119 MG/DL (ref 65–100)
HCT VFR BLD AUTO: 42.8 % (ref 36.6–50.3)
HGB BLD-MCNC: 14.9 G/DL (ref 12.1–17)
IMM GRANULOCYTES # BLD AUTO: 0 K/UL (ref 0–0.04)
IMM GRANULOCYTES NFR BLD AUTO: 0 % (ref 0–0.5)
LYMPHOCYTES # BLD: 2.5 K/UL (ref 0.8–3.5)
LYMPHOCYTES NFR BLD: 36 % (ref 12–49)
MCH RBC QN AUTO: 28.8 PG (ref 26–34)
MCHC RBC AUTO-ENTMCNC: 34.8 G/DL (ref 30–36.5)
MCV RBC AUTO: 82.8 FL (ref 80–99)
MONOCYTES # BLD: 0.6 K/UL (ref 0–1)
MONOCYTES NFR BLD: 8 % (ref 5–13)
NEUTS SEG # BLD: 3.6 K/UL (ref 1.8–8)
NEUTS SEG NFR BLD: 55 % (ref 32–75)
NRBC # BLD: 0 K/UL (ref 0–0.01)
NRBC BLD-RTO: 0 PER 100 WBC
P-R INTERVAL, ECG05: 146 MS
PLATELET # BLD AUTO: 174 K/UL (ref 150–400)
PMV BLD AUTO: 11.4 FL (ref 8.9–12.9)
POTASSIUM SERPL-SCNC: 3.1 MMOL/L (ref 3.5–5.1)
PROT SERPL-MCNC: 7.7 G/DL (ref 6.4–8.2)
Q-T INTERVAL, ECG07: 320 MS
QRS DURATION, ECG06: 114 MS
QTC CALCULATION (BEZET), ECG08: 456 MS
RBC # BLD AUTO: 5.17 M/UL (ref 4.1–5.7)
SODIUM SERPL-SCNC: 138 MMOL/L (ref 136–145)
TROPONIN-HIGH SENSITIVITY: 6 NG/L (ref 0–76)
TROPONIN-HIGH SENSITIVITY: 6 NG/L (ref 0–76)
VENTRICULAR RATE, ECG03: 122 BPM
WBC # BLD AUTO: 6.8 K/UL (ref 4.1–11.1)

## 2023-01-09 PROCEDURE — 84484 ASSAY OF TROPONIN QUANT: CPT

## 2023-01-09 PROCEDURE — 96375 TX/PRO/DX INJ NEW DRUG ADDON: CPT

## 2023-01-09 PROCEDURE — 85025 COMPLETE CBC W/AUTO DIFF WBC: CPT

## 2023-01-09 PROCEDURE — 80053 COMPREHEN METABOLIC PANEL: CPT

## 2023-01-09 PROCEDURE — 36415 COLL VENOUS BLD VENIPUNCTURE: CPT

## 2023-01-09 PROCEDURE — 93005 ELECTROCARDIOGRAM TRACING: CPT

## 2023-01-09 PROCEDURE — 96361 HYDRATE IV INFUSION ADD-ON: CPT

## 2023-01-09 PROCEDURE — 74011250637 HC RX REV CODE- 250/637: Performed by: STUDENT IN AN ORGANIZED HEALTH CARE EDUCATION/TRAINING PROGRAM

## 2023-01-09 PROCEDURE — 99285 EMERGENCY DEPT VISIT HI MDM: CPT

## 2023-01-09 PROCEDURE — 71045 X-RAY EXAM CHEST 1 VIEW: CPT

## 2023-01-09 PROCEDURE — 74011250636 HC RX REV CODE- 250/636: Performed by: STUDENT IN AN ORGANIZED HEALTH CARE EDUCATION/TRAINING PROGRAM

## 2023-01-09 PROCEDURE — 96374 THER/PROPH/DIAG INJ IV PUSH: CPT

## 2023-01-09 RX ORDER — KETOROLAC TROMETHAMINE 30 MG/ML
15 INJECTION, SOLUTION INTRAMUSCULAR; INTRAVENOUS
Status: COMPLETED | OUTPATIENT
Start: 2023-01-09 | End: 2023-01-09

## 2023-01-09 RX ORDER — ONDANSETRON 2 MG/ML
4 INJECTION INTRAMUSCULAR; INTRAVENOUS
Status: COMPLETED | OUTPATIENT
Start: 2023-01-09 | End: 2023-01-09

## 2023-01-09 RX ORDER — POTASSIUM CHLORIDE 1.5 G/1.77G
40 POWDER, FOR SOLUTION ORAL
Status: COMPLETED | OUTPATIENT
Start: 2023-01-09 | End: 2023-01-09

## 2023-01-09 RX ADMIN — ONDANSETRON 4 MG: 2 INJECTION INTRAMUSCULAR; INTRAVENOUS at 09:30

## 2023-01-09 RX ADMIN — SODIUM CHLORIDE 1000 ML: 9 INJECTION, SOLUTION INTRAVENOUS at 09:29

## 2023-01-09 RX ADMIN — KETOROLAC TROMETHAMINE 15 MG: 30 INJECTION, SOLUTION INTRAMUSCULAR; INTRAVENOUS at 09:30

## 2023-01-09 RX ADMIN — POTASSIUM CHLORIDE 40 MEQ: 1.5 POWDER, FOR SOLUTION ORAL at 09:29

## 2023-01-09 NOTE — DISCHARGE INSTRUCTIONS
Thank you! Thank you for allowing me to care for you in the emergency department. It is my goal to provide you with excellent care. If you have not received excellent quality care, please ask to speak to the nurse manager. Please fill out the survey that will come to you by mail or email since we listen to your feedback! Below you will find a list of your tests from today's visit. Should you have any questions, please do not hesitate to call the emergency department. Labs  Recent Results (from the past 12 hour(s))   EKG, 12 LEAD, INITIAL    Collection Time: 01/09/23  7:50 AM   Result Value Ref Range    Ventricular Rate 122 BPM    Atrial Rate 122 BPM    P-R Interval 146 ms    QRS Duration 114 ms    Q-T Interval 320 ms    QTC Calculation (Bezet) 456 ms    Calculated P Axis 70 degrees    Calculated R Axis 84 degrees    Calculated T Axis 55 degrees    Diagnosis       Sinus tachycardia  Otherwise normal ECG  No previous ECGs available  Confirmed by Judith Farrell MD, [facility] ([id number]) on 1/9/2023 9:09:35 AM     TROPONIN-HIGH SENSITIVITY    Collection Time: 01/09/23  8:15 AM   Result Value Ref Range    Troponin-High Sensitivity 6 0 - 76 ng/L   METABOLIC PANEL, COMPREHENSIVE    Collection Time: 01/09/23  8:15 AM   Result Value Ref Range    Sodium 138 136 - 145 mmol/L    Potassium 3.1 (L) 3.5 - 5.1 mmol/L    Chloride 104 97 - 108 mmol/L    CO2 28 21 - 32 mmol/L    Anion gap 6 5 - 15 mmol/L    Glucose 119 (H) 65 - 100 mg/dL    BUN 9 6 - 20 mg/dL    Creatinine 1.20 0.70 - 1.30 mg/dL    BUN/Creatinine ratio 8 (L) 12 - 20      eGFR >60 >60 ml/min/1.73m2    Calcium 9.4 8.5 - 10.1 mg/dL    Bilirubin, total 1.6 (H) 0.2 - 1.0 mg/dL    AST (SGOT) 21 15 - 37 U/L    ALT (SGPT) 37 12 - 78 U/L    Alk.  phosphatase 82 45 - 117 U/L    Protein, total 7.7 6.4 - 8.2 g/dL    Albumin 4.3 3.5 - 5.0 g/dL    Globulin 3.4 2.0 - 4.0 g/dL    A-G Ratio 1.3 1.1 - 2.2     CBC WITH AUTOMATED DIFF    Collection Time: 01/09/23  8:15 AM   Result Value Ref Range    WBC 6.8 4.1 - 11.1 K/uL    RBC 5.17 4.10 - 5.70 M/uL    HGB 14.9 12.1 - 17.0 g/dL    HCT 42.8 36.6 - 50.3 %    MCV 82.8 80.0 - 99.0 FL    MCH 28.8 26.0 - 34.0 PG    MCHC 34.8 30.0 - 36.5 g/dL    RDW 11.6 11.5 - 14.5 %    PLATELET 796 883 - 472 K/uL    MPV 11.4 8.9 - 12.9 FL    NRBC 0.0 0.0  WBC    ABSOLUTE NRBC 0.00 0.00 - 0.01 K/uL    NEUTROPHILS 55 32 - 75 %    LYMPHOCYTES 36 12 - 49 %    MONOCYTES 8 5 - 13 %    EOSINOPHILS 1 0 - 7 %    BASOPHILS 0 0 - 1 %    IMMATURE GRANULOCYTES 0 0 - 0.5 %    ABS. NEUTROPHILS 3.6 1.8 - 8.0 K/UL    ABS. LYMPHOCYTES 2.5 0.8 - 3.5 K/UL    ABS. MONOCYTES 0.6 0.0 - 1.0 K/UL    ABS. EOSINOPHILS 0.1 0.0 - 0.4 K/UL    ABS. BASOPHILS 0.0 0.0 - 0.1 K/UL    ABS. IMM. GRANS. 0.0 0.00 - 0.04 K/UL    DF AUTOMATED         Radiologic Studies  XR CHEST PORT   Final Result   No acute cardiopulmonary disease. CT Results  (Last 48 hours)      None          CXR Results  (Last 48 hours)                 01/09/23 0830  XR CHEST PORT Final result    Impression:  No acute cardiopulmonary disease. Narrative:  INDICATION: Chest pain. Portable AP view of the chest.       There is no prior study for direct comparison. Cardiomediastinal silhouette is within normal limits. Lungs are clear   bilaterally. Pleural spaces are normal and there is no pneumothorax. Osseous   structures are intact.                 ------------------------------------------------------------------------------------------------------------  The exam and treatment you received in the Emergency Department were for an urgent problem and are not intended as complete care. It is important that you follow-up with a doctor, nurse practitioner, or physician assistant to:  (1) confirm your diagnosis,  (2) re-evaluation of changes in your illness and treatment, and  (3) for ongoing care. Please take your discharge instructions with you when you go to your follow-up appointment.      If you have any problem arranging a follow-up appointment, contact the Emergency Department. If your symptoms become worse or you do not improve as expected and you are unable to reach your health care provider, please return to the Emergency Department. We are available 24 hours a day. If a prescription has been provided, please have it filled as soon as possible to prevent a delay in treatment. If you have any questions or reservations about taking the medication due to side effects or interactions with other medications, please call your primary care provider or contact the ER.

## 2023-01-09 NOTE — ED TRIAGE NOTES
C/o left sided CP that has been intermittent this morning  Was at work when he noticed the pain started and his heart rate jumped up to approx 150-170.

## 2023-01-13 ENCOUNTER — VIRTUAL VISIT (OUTPATIENT)
Dept: FAMILY MEDICINE CLINIC | Age: 23
End: 2023-01-13
Payer: COMMERCIAL

## 2023-01-13 DIAGNOSIS — R07.9 CHEST PAIN IN ADULT: Primary | ICD-10-CM

## 2023-01-13 NOTE — PROGRESS NOTES
Beny Ortiz  20 y.o. male  2000  [address] [address]  [id number]   [address]:    Telemedicine Progress Note  Sarah Alves MD       Encounter Date and Time: January 13, 2023 at 12:58 PM    Consent: Beny Ortiz, who was seen by synchronous (real-time) audio-video technology, and/or his healthcare decision maker, is aware that this patient-initiated, Telehealth encounter on 1/13/2023 is a billable service, with coverage as determined by his insurance carrier. He is aware that he may receive a bill and has provided verbal consent to proceed: Yes. Chief Complaint   Patient presents with    Follow-up     Chest pain      History of Present Illness   Beny Ortiz is a 20 y.o. male was evaluated by synchronous (real-time) audio-video technology from home, through a secure patient portal.    Patient with PMHx of anxiety presents for ED follow up. Patient visited ED for sharp, stabbing left sided chest pain on 01/09/23 at his job, his job is sedentary, during this episode his watch recorded  heart rate in the 150-170's. Chest pain work up in the ED was unrevealing except for EKG with sinus tachycardia and heart rate of 118. Chest pain was localized on the left side of the chest, symptoms mostly resolved during the ED visit, with a couple intermittent episodes afterwards, last on on 01/11. Patient describe pain as mild, \"soreness type\" of chest pain. Sometimes it's exertional and associated with  shortness of breath. Sometimes chest pain is present at rest as well. Symptoms ongoing for about one year. Stretching his chest wall can sometimes decrease the level of pain. No recent episodes of syncope, pulmonary embolisms or illicit drug use. Had positive lyme testing with negative western blot, received tx with Doxy for 28 days. Had Covid in 2021.      Per chart review, seems like patient was seen by Cardiology in 2019 after with chest pain that was followed by a syncopal episode. At the time work up was also unrevealing except for an ECG  normal sinus rhythm, RBBB with early repolarization. RBBB is new. He was seen during his childhood by Pediatric Cardiology for a benign essential murmur that resolved. Per Cardiology notes: (2019) Nayana Burns MD  TTE today shows normal LV systolic function (LVEF - 50 - 55%), mildly dilated RV with normal RV systolic function. Pediatric Cardiology 2006:  Family history is pertinent for a maternal aunt who  at 1years of age from a \"hole in  the heart;\" mother's sister  before Maya Bauer' mother was born. Additional factors   include her premature birth at 2 pounds 2 ounces. Mom knows no details. Maternal great   grandfather  at age 36 from congestive heart failure. Review of Systems   Reports occasional heartburn. Denies nausea, vomiting, palpitations, LE edema or abdominal pain. Denies severe anxiety or panic attacks. Vitals/Objective:     General: alert, cooperative, no distress   Mental  status: mental status: alert, oriented to person, place, and time, normal mood, behavior, speech, dress, motor activity, and thought processes   Resp: resp: normal effort and no respiratory distress   Neuro: neuro: no gross deficits   Skin: skin: no discoloration or lesions of concern on visible areas   Due to this being a TeleHealth evaluation, many elements of the physical examination are unable to be assessed. Assessment and Plan:   Time-based coding, delete if not needed: I spent at least 25 minutes with this established patient, and >50% of the time was spent counseling and/or coordinating care regarding      1. Chest pain in adult: ongoing symptoms for about a year, recent visit to the ED with unrevealing work up. It looks like symptoms have dated back for years. Chest pain can be exertional and associated with shortness of breath. Has had several office visit with elevated blood pressure readings. Patient will benefit for Cardiology evaluation to include stress test and cardiac echo. Df dx include costochondritis, undiagnosed arrhythmias, pericarditis, myocarditis, PE,   - REFERRAL TO CARDIOLOGY; Future  - Cardiac echo     2. Elevated blood pressure: needs to follow up closely for blood pressure control. Time spent in direct conversation with the patient to include medical condition(s) discussed, assessment and treatment plan:       We discussed the expected course, resolution and complications of the diagnosis(es) in detail. Medication risks, benefits, costs, interactions, and alternatives were discussed as indicated. I advised him to contact the office if his condition worsens, changes or fails to improve as anticipated. He expressed understanding with the diagnosis(es) and plan. Patient understands that this encounter was a temporary measure, and the importance of further follow up and examination was emphasized. Patient verbalized understanding. Patient informed to follow up: as needed. Electronically Signed: Ysabel Gomez MD    CPT Codes 34856-36333 for Established Patients may apply to this Telehealth Visit. POS code: 18. Modifier GT    Robert Linder is a 20 y.o. male who was evaluated by an audio-video encounter for concerns as above. Patient identification was verified prior to start of the visit. A caregiver was present when appropriate. Due to this being a TeleHealth encounter (During ZVAGA-28 public health emergency), evaluation of the following organ systems was limited: Vitals/Constitutional/EENT/Resp/CV/GI//MS/Neuro/Skin/Heme-Lymph-Imm.   Pursuant to the emergency declaration under the [address] [place] [address] and the [employer] Act, this Virtual Visit was conducted, with patient's (and/or legal guardian's) consent, to reduce the patient's risk of exposure to COVID-19 and provide necessary medical care. Services were provided through a synchronous discussion virtually to substitute for in-person clinic visit. I was  at the hospital . The patient was at home. History   Patients past medical, surgical and family histories were reviewed and updated. Past Medical History:   Diagnosis Date    Anxiety      No past surgical history on file. No family history on file. Social History     Tobacco Use    Smoking status: Never    Smokeless tobacco: Never   Substance Use Topics    Alcohol use: Not Currently    Drug use: Never     There is no problem list on file for this patient. Current Medications/Allergies   Medications and Allergies reviewed:    Current Outpatient Medications   Medication Sig Dispense Refill    sertraline (ZOLOFT) 50 mg tablet Take 1 Tablet by mouth daily.  60 Tablet 1     No Known Allergies

## 2023-02-14 ENCOUNTER — HOSPITAL ENCOUNTER (OUTPATIENT)
Dept: NON INVASIVE DIAGNOSTICS | Age: 23
Discharge: HOME | End: 2023-02-14
Attending: STUDENT IN AN ORGANIZED HEALTH CARE EDUCATION/TRAINING PROGRAM
Payer: COMMERCIAL

## 2023-02-14 VITALS
BODY MASS INDEX: 25.45 KG/M2 | DIASTOLIC BLOOD PRESSURE: 79 MMHG | WEIGHT: 220 LBS | HEIGHT: 78 IN | SYSTOLIC BLOOD PRESSURE: 136 MMHG

## 2023-02-14 DIAGNOSIS — R07.9 CHEST PAIN IN ADULT: ICD-10-CM

## 2023-02-14 LAB
ECHO AO ROOT DIAM: 3.6 CM
ECHO AO ROOT INDEX: 1.53 CM/M2
ECHO AV AREA PEAK VELOCITY: 3.1 CM2
ECHO AV AREA/BSA PEAK VELOCITY: 1.3 CM2/M2
ECHO AV PEAK GRADIENT: 5 MMHG
ECHO AV PEAK VELOCITY: 1.2 M/S
ECHO AV VELOCITY RATIO: 0.67
ECHO EST RA PRESSURE: 3 MMHG
ECHO LA DIAMETER INDEX: 1.4 CM/M2
ECHO LA DIAMETER: 3.3 CM
ECHO LA TO AORTIC ROOT RATIO: 0.92
ECHO LA VOL 2C: 70 ML (ref 18–58)
ECHO LA VOL 4C: 28 ML (ref 18–58)
ECHO LA VOL BP: 47 ML (ref 18–58)
ECHO LA VOL/BSA BIPLANE: 20 ML/M2 (ref 16–34)
ECHO LA VOLUME AREA LENGTH: 51 ML
ECHO LA VOLUME INDEX A2C: 30 ML/M2 (ref 16–34)
ECHO LA VOLUME INDEX A4C: 12 ML/M2 (ref 16–34)
ECHO LA VOLUME INDEX AREA LENGTH: 22 ML/M2 (ref 16–34)
ECHO LV E' LATERAL VELOCITY: 17 CM/S
ECHO LV E' SEPTAL VELOCITY: 8 CM/S
ECHO LV FRACTIONAL SHORTENING: 27 % (ref 28–44)
ECHO LV INTERNAL DIMENSION DIASTOLE INDEX: 2.21 CM/M2
ECHO LV INTERNAL DIMENSION DIASTOLIC: 5.2 CM (ref 4.2–5.9)
ECHO LV INTERNAL DIMENSION SYSTOLIC INDEX: 1.62 CM/M2
ECHO LV INTERNAL DIMENSION SYSTOLIC: 3.8 CM
ECHO LV IVSD: 0.8 CM (ref 0.6–1)
ECHO LV MASS 2D: 133.8 G (ref 88–224)
ECHO LV MASS INDEX 2D: 57 G/M2 (ref 49–115)
ECHO LV POSTERIOR WALL DIASTOLIC: 0.7 CM (ref 0.6–1)
ECHO LV RELATIVE WALL THICKNESS RATIO: 0.27
ECHO LVOT AREA: 4.5 CM2
ECHO LVOT DIAM: 2.4 CM
ECHO LVOT PEAK GRADIENT: 3 MMHG
ECHO LVOT PEAK VELOCITY: 0.8 M/S
ECHO MV A VELOCITY: 0.49 M/S
ECHO MV AREA PHT: 4 CM2
ECHO MV E DECELERATION TIME (DT): 190.9 MS
ECHO MV E VELOCITY: 0.43 M/S
ECHO MV E/A RATIO: 0.88
ECHO MV E/E' LATERAL: 2.53
ECHO MV E/E' RATIO (AVERAGED): 3.95
ECHO MV E/E' SEPTAL: 5.38
ECHO MV PRESSURE HALF TIME (PHT): 55.4 MS
ECHO PV MAX VELOCITY: 0.7 M/S
ECHO PV PEAK GRADIENT: 2 MMHG
ECHO RIGHT VENTRICULAR SYSTOLIC PRESSURE (RVSP): 29 MMHG
ECHO RV FREE WALL PEAK S': 9 CM/S
ECHO RV INTERNAL DIMENSION: 3.1 CM
ECHO RV TAPSE: 2 CM (ref 1.7–?)
ECHO TV REGURGITANT MAX VELOCITY: 2.53 M/S
ECHO TV REGURGITANT PEAK GRADIENT: 26 MMHG

## 2023-02-14 PROCEDURE — 93306 TTE W/DOPPLER COMPLETE: CPT | Performed by: SPECIALIST

## 2023-02-14 PROCEDURE — 93306 TTE W/DOPPLER COMPLETE: CPT

## 2023-02-26 NOTE — ED PROVIDER NOTES
EMERGENCY DEPARTMENT HISTORY AND PHYSICAL EXAM      Date: 1/9/2023  Patient Name: Juan David Lunsford    History of Presenting Illness     Chief Complaint   Patient presents with    Chest Pain       History Provided By: Patient    HPI: Juan David Lunsford, 20 y.o. male with past medical history of anxiety presents for evaluation of left-sided chest pain. Patient endorses discrete, very short-lived moments of sharp chest pain under his left breast.  This was nonexertional, nonpositional.  He has experienced similar sensations before, but those have been more sternal.  Denies any associated difficulty breathing, nausea or vomiting. No history of cardiac disease personally or in his family history. Pain is improving now without intervention. There are no other complaints, changes, or physical findings at this time. Past History     Past Medical History:  Past Medical History:   Diagnosis Date    Anxiety        Past Surgical History:  No past surgical history on file. Family History:  History reviewed. No pertinent family history. Social History:  Social History     Tobacco Use    Smoking status: Never    Smokeless tobacco: Never   Substance Use Topics    Alcohol use: Not Currently    Drug use: Never       Allergies:  No Known Allergies    PCP: Mitali Retana MD    No current facility-administered medications on file prior to encounter. Current Outpatient Medications on File Prior to Encounter   Medication Sig Dispense Refill    sertraline (ZOLOFT) 50 mg tablet Take 1 Tablet by mouth daily. 60 Tablet 1       Review of Systems   Review of Systems  Constitutional: Negative except as in HPI. Eyes: Negative except as in HPI.  ENT: Negative except as in HPI. Cardiovascular: Negative except as in HPI. Respiratory: Negative except as in HPI. Gastrointestinal: Negative except as in HPI. Genitourinary: Negative except as in HPI. Musculoskeletal: Negative except as in HPI.   Integumentary: Negative except as in HPI.  Neurological: Negative except as in HPI. Psychiatric: Negative except as in HPI. Endocrine: Negative except as in HPI. Hematologic/Lymphatic: Negative except as in HPI. Allergic/Immunologic: Negative except as in HPI. Physical Exam   Physical Exam  Vitals reviewed. Constitutional:       General: He is not in acute distress. Appearance: He is not toxic-appearing. HENT:      Head: Normocephalic and atraumatic. Eyes:      Extraocular Movements: Extraocular movements intact. Pupils: Pupils are equal, round, and reactive to light. Cardiovascular:      Rate and Rhythm: Normal rate and regular rhythm. Heart sounds: Normal heart sounds. Pulmonary:      Effort: Pulmonary effort is normal.      Breath sounds: Normal breath sounds. Abdominal:      Palpations: Abdomen is soft. Tenderness: There is no abdominal tenderness. Musculoskeletal:      Right lower leg: No edema. Left lower leg: No edema. Skin:     General: Skin is warm and dry. Neurological:      General: No focal deficit present. Mental Status: He is alert.    Psychiatric:         Mood and Affect: Mood normal.         Behavior: Behavior normal.        Lab and Diagnostic Study Results   Labs -     Recent Results (from the past 12 hour(s))   EKG, 12 LEAD, INITIAL    Collection Time: 01/09/23  7:50 AM   Result Value Ref Range    Ventricular Rate 122 BPM    Atrial Rate 122 BPM    P-R Interval 146 ms    QRS Duration 114 ms    Q-T Interval 320 ms    QTC Calculation (Bezet) 456 ms    Calculated P Axis 70 degrees    Calculated R Axis 84 degrees    Calculated T Axis 55 degrees    Diagnosis       Sinus tachycardia  Otherwise normal ECG  No previous ECGs available  Confirmed by Lord Zack PEREZ, MIKIE ([id number]) on 1/9/2023 9:09:35 AM     TROPONIN-HIGH SENSITIVITY    Collection Time: 01/09/23  8:15 AM   Result Value Ref Range    Troponin-High Sensitivity 6 0 - 76 ng/L   METABOLIC PANEL, COMPREHENSIVE    Collection Time: 01/09/23 8:15 AM   Result Value Ref Range    Sodium 138 136 - 145 mmol/L    Potassium 3.1 (L) 3.5 - 5.1 mmol/L    Chloride 104 97 - 108 mmol/L    CO2 28 21 - 32 mmol/L    Anion gap 6 5 - 15 mmol/L    Glucose 119 (H) 65 - 100 mg/dL    BUN 9 6 - 20 mg/dL    Creatinine 1.20 0.70 - 1.30 mg/dL    BUN/Creatinine ratio 8 (L) 12 - 20      eGFR >60 >60 ml/min/1.73m2    Calcium 9.4 8.5 - 10.1 mg/dL    Bilirubin, total 1.6 (H) 0.2 - 1.0 mg/dL    AST (SGOT) 21 15 - 37 U/L    ALT (SGPT) 37 12 - 78 U/L    Alk. phosphatase 82 45 - 117 U/L    Protein, total 7.7 6.4 - 8.2 g/dL    Albumin 4.3 3.5 - 5.0 g/dL    Globulin 3.4 2.0 - 4.0 g/dL    A-G Ratio 1.3 1.1 - 2.2     CBC WITH AUTOMATED DIFF    Collection Time: 01/09/23  8:15 AM   Result Value Ref Range    WBC 6.8 4.1 - 11.1 K/uL    RBC 5.17 4.10 - 5.70 M/uL    HGB 14.9 12.1 - 17.0 g/dL    HCT 42.8 36.6 - 50.3 %    MCV 82.8 80.0 - 99.0 FL    MCH 28.8 26.0 - 34.0 PG    MCHC 34.8 30.0 - 36.5 g/dL    RDW 11.6 11.5 - 14.5 %    PLATELET 443 689 - 486 K/uL    MPV 11.4 8.9 - 12.9 FL    NRBC 0.0 0.0  WBC    ABSOLUTE NRBC 0.00 0.00 - 0.01 K/uL    NEUTROPHILS 55 32 - 75 %    LYMPHOCYTES 36 12 - 49 %    MONOCYTES 8 5 - 13 %    EOSINOPHILS 1 0 - 7 %    BASOPHILS 0 0 - 1 %    IMMATURE GRANULOCYTES 0 0 - 0.5 %    ABS. NEUTROPHILS 3.6 1.8 - 8.0 K/UL    ABS. LYMPHOCYTES 2.5 0.8 - 3.5 K/UL    ABS. MONOCYTES 0.6 0.0 - 1.0 K/UL    ABS. EOSINOPHILS 0.1 0.0 - 0.4 K/UL    ABS. BASOPHILS 0.0 0.0 - 0.1 K/UL    ABS. IMM. GRANS. 0.0 0.00 - 0.04 K/UL    DF AUTOMATED         Radiologic Studies -   @lastxrresult@  CT Results  (Last 48 hours)      None          CXR Results  (Last 48 hours)                 01/09/23 0830  XR CHEST PORT Final result    Impression:  No acute cardiopulmonary disease. Narrative:  INDICATION: Chest pain. Portable AP view of the chest.       There is no prior study for direct comparison. Cardiomediastinal silhouette is within normal limits.  Lungs are clear bilaterally. Pleural spaces are normal and there is no pneumothorax. Osseous   structures are intact. Medical Decision Making and ED Course   Differential Diagnosis & Medical Decision Making Provider Note:   67-year-old male presents for evaluation of left-sided chest pain. Pain resolving now without intervention. Very low risk profile for cardiac event but will rule out ACS with ECG and troponin. Chest x-ray to evaluate for pneumonia. Suspect chest wall pain is musculoskeletal in nature. Initially tachycardic but this resolved without intervention so do not suspect PE, PERC is negative. - I am the first provider for this patient. I reviewed the vital signs, available nursing notes, past medical history, past surgical history, family history and social history. The patients presenting problems have been discussed, and they are in agreement with the care plan formulated and outlined with them. I have encouraged them to ask questions as they arise throughout their visit. Vital Signs-Reviewed the patient's vital signs. Patient Vitals for the past 12 hrs:   Temp Pulse Resp BP SpO2   01/09/23 0852 -- 99 -- -- --   01/09/23 0752 98.5 °F (36.9 °C) (!) 118 20 130/85 100 %       ED Course:   ED Course as of 01/09/23 1033   Mon Jan 09, 2023   0851 ECG performed at 750 and interpreted by me shows sinus tachycardia with a ventricular rate of 22, normal intervals, no ST elevation or depression [BQ]      ED Course User Index  [BQ] Zacarias Wolfe MD         Procedures   Performed by: Duncan Cifuentes MD  Procedures      Disposition   Disposition: DC- Adult Discharges: All of the diagnostic tests were reviewed and questions answered. Diagnosis, care plan and treatment options were discussed. The patient understands the instructions and will follow up as directed. The patients results have been reviewed with them. They have been counseled regarding their diagnosis.   The patient verbally convey understanding and agreement of the signs, symptoms, diagnosis, treatment and prognosis and additionally agrees to follow up as recommended with their PCP in 24 - 48 hours. They also agree with the care-plan and convey that all of their questions have been answered. I have also put together some discharge instructions for them that include: 1) educational information regarding their diagnosis, 2) how to care for their diagnosis at home, as well a 3) list of reasons why they would want to return to the ED prior to their follow-up appointment, should their condition change. DISCHARGE PLAN:  1. Current Discharge Medication List        CONTINUE these medications which have NOT CHANGED    Details   sertraline (ZOLOFT) 50 mg tablet Take 1 Tablet by mouth daily. Qty: 60 Tablet, Refills: 1    Associated Diagnoses: Anxiety           2. Follow-up Information       Follow up With Specialties Details Why Contact Info    Ravinder Talavera MD [facility]  Primary Care, As needed [address] [facility]  [id number] [phone]            3. Return to ED if worse   4. Current Discharge Medication List            Diagnosis/Clinical Impression     Clinical Impression:   1. Chest wall pain        Attestations: I, Kaylene Francois MD, am the primary clinician of record. Please note that this dictation was completed with [employer], the computer voice recognition software. Quite often unanticipated grammatical, syntax, homophones, and other interpretive errors are inadvertently transcribed by the computer software. Please disregard these errors. Please excuse any errors that have escaped final proofreading. Thank you. No

## 2023-05-12 DIAGNOSIS — F41.9 ANXIETY DISORDER, UNSPECIFIED: ICD-10-CM

## 2023-09-26 ENCOUNTER — HOSPITAL ENCOUNTER (EMERGENCY)
Facility: HOSPITAL | Age: 23
Discharge: HOME | End: 2023-09-26
Attending: EMERGENCY MEDICINE
Payer: COMMERCIAL

## 2023-09-26 ENCOUNTER — APPOINTMENT (OUTPATIENT)
Facility: HOSPITAL | Age: 23
End: 2023-09-26
Payer: COMMERCIAL

## 2023-09-26 ENCOUNTER — OFFICE VISIT (OUTPATIENT)
Age: 23
End: 2023-09-26

## 2023-09-26 VITALS
DIASTOLIC BLOOD PRESSURE: 82 MMHG | BODY MASS INDEX: 25.22 KG/M2 | SYSTOLIC BLOOD PRESSURE: 149 MMHG | RESPIRATION RATE: 14 BRPM | HEIGHT: 78 IN | OXYGEN SATURATION: 98 % | WEIGHT: 218 LBS | TEMPERATURE: 98 F | HEART RATE: 91 BPM

## 2023-09-26 VITALS
OXYGEN SATURATION: 100 % | BODY MASS INDEX: 25.45 KG/M2 | WEIGHT: 220 LBS | HEART RATE: 93 BPM | DIASTOLIC BLOOD PRESSURE: 73 MMHG | SYSTOLIC BLOOD PRESSURE: 143 MMHG | TEMPERATURE: 97.6 F | RESPIRATION RATE: 16 BRPM | HEIGHT: 78 IN

## 2023-09-26 DIAGNOSIS — K92.1 MELENA: Primary | ICD-10-CM

## 2023-09-26 DIAGNOSIS — R10.9 ABDOMINAL PAIN, UNSPECIFIED ABDOMINAL LOCATION: Primary | ICD-10-CM

## 2023-09-26 DIAGNOSIS — R00.0 TACHYCARDIA: ICD-10-CM

## 2023-09-26 LAB
ALBUMIN SERPL-MCNC: 4.3 G/DL (ref 3.5–5)
ALBUMIN/GLOB SERPL: 1.1 (ref 1.1–2.2)
ALP SERPL-CCNC: 102 U/L (ref 45–117)
ALT SERPL-CCNC: 29 U/L (ref 12–78)
ANION GAP SERPL CALC-SCNC: 5 MMOL/L (ref 5–15)
AST SERPL-CCNC: 16 U/L (ref 15–37)
BASOPHILS # BLD: 0 K/UL (ref 0–0.1)
BASOPHILS NFR BLD: 0 % (ref 0–1)
BILIRUB SERPL-MCNC: 0.8 MG/DL (ref 0.2–1)
BUN SERPL-MCNC: 10 MG/DL (ref 6–20)
BUN/CREAT SERPL: 9 (ref 12–20)
CALCIUM SERPL-MCNC: 9.2 MG/DL (ref 8.5–10.1)
CHLORIDE SERPL-SCNC: 105 MMOL/L (ref 97–108)
CO2 SERPL-SCNC: 27 MMOL/L (ref 21–32)
COMMENT:: NORMAL
CREAT SERPL-MCNC: 1.11 MG/DL (ref 0.7–1.3)
DIFFERENTIAL METHOD BLD: NORMAL
EOSINOPHIL # BLD: 0.1 K/UL (ref 0–0.4)
EOSINOPHIL NFR BLD: 1 % (ref 0–7)
ERYTHROCYTE [DISTWIDTH] IN BLOOD BY AUTOMATED COUNT: 11.7 % (ref 11.5–14.5)
GLOBULIN SER CALC-MCNC: 3.9 G/DL (ref 2–4)
GLUCOSE SERPL-MCNC: 126 MG/DL (ref 65–100)
HCT VFR BLD AUTO: 42.7 % (ref 36.6–50.3)
HGB BLD-MCNC: 14.6 G/DL (ref 12.1–17)
IMM GRANULOCYTES # BLD AUTO: 0 K/UL (ref 0–0.04)
IMM GRANULOCYTES NFR BLD AUTO: 0 % (ref 0–0.5)
LYMPHOCYTES # BLD: 2.4 K/UL (ref 0.8–3.5)
LYMPHOCYTES NFR BLD: 34 % (ref 12–49)
MCH RBC QN AUTO: 28.6 PG (ref 26–34)
MCHC RBC AUTO-ENTMCNC: 34.2 G/DL (ref 30–36.5)
MCV RBC AUTO: 83.6 FL (ref 80–99)
MONOCYTES # BLD: 0.6 K/UL (ref 0–1)
MONOCYTES NFR BLD: 9 % (ref 5–13)
NEUTS SEG # BLD: 4 K/UL (ref 1.8–8)
NEUTS SEG NFR BLD: 56 % (ref 32–75)
NRBC # BLD: 0 K/UL (ref 0–0.01)
NRBC BLD-RTO: 0 PER 100 WBC
PLATELET # BLD AUTO: 187 K/UL (ref 150–400)
PMV BLD AUTO: 11.7 FL (ref 8.9–12.9)
POTASSIUM SERPL-SCNC: 3.6 MMOL/L (ref 3.5–5.1)
PROT SERPL-MCNC: 8.2 G/DL (ref 6.4–8.2)
RBC # BLD AUTO: 5.11 M/UL (ref 4.1–5.7)
SODIUM SERPL-SCNC: 137 MMOL/L (ref 136–145)
SPECIMEN HOLD: NORMAL
WBC # BLD AUTO: 7.2 K/UL (ref 4.1–11.1)

## 2023-09-26 PROCEDURE — 74174 CTA ABD&PLVS W/CONTRAST: CPT

## 2023-09-26 PROCEDURE — 80053 COMPREHEN METABOLIC PANEL: CPT

## 2023-09-26 PROCEDURE — 85025 COMPLETE CBC W/AUTO DIFF WBC: CPT

## 2023-09-26 PROCEDURE — 99285 EMERGENCY DEPT VISIT HI MDM: CPT

## 2023-09-26 PROCEDURE — 36415 COLL VENOUS BLD VENIPUNCTURE: CPT

## 2023-09-26 PROCEDURE — 6360000004 HC RX CONTRAST MEDICATION: Performed by: NURSE PRACTITIONER

## 2023-09-26 RX ADMIN — IOPAMIDOL 100 ML: 755 INJECTION, SOLUTION INTRAVENOUS at 21:25

## 2023-09-26 SDOH — ECONOMIC STABILITY: FOOD INSECURITY: WITHIN THE PAST 12 MONTHS, THE FOOD YOU BOUGHT JUST DIDN'T LAST AND YOU DIDN'T HAVE MONEY TO GET MORE.: NEVER TRUE

## 2023-09-26 SDOH — ECONOMIC STABILITY: FOOD INSECURITY: WITHIN THE PAST 12 MONTHS, YOU WORRIED THAT YOUR FOOD WOULD RUN OUT BEFORE YOU GOT MONEY TO BUY MORE.: NEVER TRUE

## 2023-09-26 SDOH — ECONOMIC STABILITY: INCOME INSECURITY: HOW HARD IS IT FOR YOU TO PAY FOR THE VERY BASICS LIKE FOOD, HOUSING, MEDICAL CARE, AND HEATING?: NOT HARD AT ALL

## 2023-09-26 SDOH — ECONOMIC STABILITY: HOUSING INSECURITY
IN THE LAST 12 MONTHS, WAS THERE A TIME WHEN YOU DID NOT HAVE A STEADY PLACE TO SLEEP OR SLEPT IN A SHELTER (INCLUDING NOW)?: NO

## 2023-09-26 ASSESSMENT — ENCOUNTER SYMPTOMS
SINUS PAIN: 0
TROUBLE SWALLOWING: 0
SHORTNESS OF BREATH: 0
SINUS PRESSURE: 0
ABDOMINAL PAIN: 1
EYE PAIN: 0
NAUSEA: 0
EYE REDNESS: 0
ABDOMINAL DISTENTION: 0
VOMITING: 0
COLOR CHANGE: 0
COUGH: 0

## 2023-09-26 ASSESSMENT — PAIN DESCRIPTION - ORIENTATION: ORIENTATION: RIGHT;LOWER

## 2023-09-26 ASSESSMENT — PAIN DESCRIPTION - LOCATION: LOCATION: ABDOMEN

## 2023-09-26 ASSESSMENT — PAIN SCALES - GENERAL: PAINLEVEL_OUTOF10: 3

## 2023-09-26 ASSESSMENT — PAIN DESCRIPTION - DESCRIPTORS: DESCRIPTORS: SHARP

## 2023-09-26 ASSESSMENT — PAIN - FUNCTIONAL ASSESSMENT: PAIN_FUNCTIONAL_ASSESSMENT: 0-10

## 2023-09-27 NOTE — ED PROVIDER NOTES
(*)     All other components within normal limits   EXTRA TUBES HOLD   CBC WITH AUTO DIFFERENTIAL   EXTRA TUBE, BLOOD BANK       CTA ABDOMEN PELVIS W WO CONTRAST   Final Result   No evidence of abdominal aortic aneurysm or dissection. No acute abnormality is   identified. 10:13 PM  Pt has been reexamined. Pt has no new complaints, changes or physical findings. Care plan outlined and precautions discussed. All available results were reviewed with pt. All medications were reviewed with pt. All of pt's questions and concerns were addressed. Pt agrees to F/U as instructed and agrees to return to ED upon further deterioration. Pt is ready to go home.   JUANY Oliver NP, APRN - NP  09/26/23 9710

## 2023-09-27 NOTE — ED TRIAGE NOTES
Pt ambulatory to triage w/ c/o black stools that started today. Pt reports having abd pain for the past few weeks and saw PCP today who sent him her.

## 2023-12-06 DIAGNOSIS — F41.9 ANXIETY DISORDER, UNSPECIFIED: ICD-10-CM

## 2023-12-07 DIAGNOSIS — F41.9 ANXIETY DISORDER, UNSPECIFIED: ICD-10-CM

## 2024-02-05 DIAGNOSIS — F41.9 ANXIETY DISORDER, UNSPECIFIED: ICD-10-CM

## 2024-03-04 DIAGNOSIS — F41.9 ANXIETY DISORDER, UNSPECIFIED: ICD-10-CM

## 2024-03-09 DIAGNOSIS — F41.9 ANXIETY DISORDER, UNSPECIFIED: ICD-10-CM

## 2024-03-14 DIAGNOSIS — F41.9 ANXIETY DISORDER, UNSPECIFIED: ICD-10-CM

## 2024-04-02 DIAGNOSIS — F41.9 ANXIETY DISORDER, UNSPECIFIED: ICD-10-CM

## 2024-04-04 RX ORDER — SERTRALINE HYDROCHLORIDE 25 MG/1
TABLET, FILM COATED ORAL
Qty: 60 TABLET | Refills: 2 | Status: SHIPPED | OUTPATIENT
Start: 2024-04-04

## 2024-08-22 DIAGNOSIS — F41.9 ANXIETY DISORDER, UNSPECIFIED: ICD-10-CM

## 2024-08-22 RX ORDER — SERTRALINE HYDROCHLORIDE 25 MG/1
TABLET, FILM COATED ORAL
Qty: 60 TABLET | Refills: 2 | OUTPATIENT
Start: 2024-08-22

## 2024-08-26 RX ORDER — SERTRALINE HYDROCHLORIDE 25 MG/1
TABLET, FILM COATED ORAL
Qty: 60 TABLET | Refills: 2 | OUTPATIENT
Start: 2024-08-26

## 2024-08-26 NOTE — TELEPHONE ENCOUNTER
Pt called to check on the status of this request. This writer encouraged pt to schedule an appt. He is scheduled for 9/16; however, he stated that he is going on vacation in 3 days and asked to receive a short refill to last until his appt.    Thank you

## 2024-08-27 DIAGNOSIS — F41.9 ANXIETY DISORDER, UNSPECIFIED: ICD-10-CM

## 2024-08-27 RX ORDER — SERTRALINE HYDROCHLORIDE 25 MG/1
25 TABLET, FILM COATED ORAL DAILY
Qty: 60 TABLET | Refills: 0 | Status: SHIPPED | OUTPATIENT
Start: 2024-08-27 | End: 2024-08-30 | Stop reason: SDUPTHER

## 2024-08-27 NOTE — PROGRESS NOTES
Patient has not been seen in almost a year. Will send 60 day prescription to bridge until patient can schedule appt.    Please print the prescan located in the Med Info Form encounter dated 11/12/18, review and sign the formFax back to forms completion at [phone]

## 2024-08-30 DIAGNOSIS — F41.9 ANXIETY DISORDER, UNSPECIFIED: ICD-10-CM

## 2024-08-30 RX ORDER — SERTRALINE HYDROCHLORIDE 25 MG/1
25 TABLET, FILM COATED ORAL DAILY
Qty: 30 TABLET | Refills: 0 | Status: SHIPPED | OUTPATIENT
Start: 2024-08-30

## 2024-09-26 ENCOUNTER — OFFICE VISIT (OUTPATIENT)
Age: 24
End: 2024-09-26

## 2024-09-26 VITALS
OXYGEN SATURATION: 98 % | HEART RATE: 92 BPM | SYSTOLIC BLOOD PRESSURE: 126 MMHG | DIASTOLIC BLOOD PRESSURE: 80 MMHG | TEMPERATURE: 98 F | WEIGHT: 215 LBS | BODY MASS INDEX: 24.22 KG/M2

## 2024-09-26 DIAGNOSIS — F41.1 GENERALIZED ANXIETY DISORDER: Primary | ICD-10-CM

## 2024-09-26 RX ORDER — SERTRALINE HYDROCHLORIDE 25 MG/1
25 TABLET, FILM COATED ORAL DAILY
Qty: 30 TABLET | Refills: 0 | Status: SHIPPED | OUTPATIENT
Start: 2024-09-26 | End: 2024-09-26 | Stop reason: SDUPTHER

## 2024-09-26 RX ORDER — SERTRALINE HYDROCHLORIDE 25 MG/1
25 TABLET, FILM COATED ORAL DAILY
Qty: 180 TABLET | Refills: 3 | Status: SHIPPED | OUTPATIENT
Start: 2024-09-26 | End: 2024-09-26

## 2024-09-26 RX ORDER — SERTRALINE HYDROCHLORIDE 25 MG/1
50 TABLET, FILM COATED ORAL DAILY
Qty: 180 TABLET | Refills: 3 | Status: SHIPPED | OUTPATIENT
Start: 2024-09-26

## 2024-09-26 SDOH — ECONOMIC STABILITY: FOOD INSECURITY: WITHIN THE PAST 12 MONTHS, THE FOOD YOU BOUGHT JUST DIDN'T LAST AND YOU DIDN'T HAVE MONEY TO GET MORE.: NEVER TRUE

## 2024-09-26 SDOH — ECONOMIC STABILITY: FOOD INSECURITY: WITHIN THE PAST 12 MONTHS, YOU WORRIED THAT YOUR FOOD WOULD RUN OUT BEFORE YOU GOT MONEY TO BUY MORE.: NEVER TRUE

## 2024-09-26 SDOH — ECONOMIC STABILITY: INCOME INSECURITY: HOW HARD IS IT FOR YOU TO PAY FOR THE VERY BASICS LIKE FOOD, HOUSING, MEDICAL CARE, AND HEATING?: NOT HARD AT ALL

## 2024-09-26 ASSESSMENT — PATIENT HEALTH QUESTIONNAIRE - PHQ9
6. FEELING BAD ABOUT YOURSELF - OR THAT YOU ARE A FAILURE OR HAVE LET YOURSELF OR YOUR FAMILY DOWN: NOT AT ALL
3. TROUBLE FALLING OR STAYING ASLEEP: SEVERAL DAYS
SUM OF ALL RESPONSES TO PHQ QUESTIONS 1-9: 5
1. LITTLE INTEREST OR PLEASURE IN DOING THINGS: SEVERAL DAYS
2. FEELING DOWN, DEPRESSED OR HOPELESS: NOT AT ALL
SUM OF ALL RESPONSES TO PHQ QUESTIONS 1-9: 5
SUM OF ALL RESPONSES TO PHQ9 QUESTIONS 1 & 2: 1
8. MOVING OR SPEAKING SO SLOWLY THAT OTHER PEOPLE COULD HAVE NOTICED. OR THE OPPOSITE, BEING SO FIGETY OR RESTLESS THAT YOU HAVE BEEN MOVING AROUND A LOT MORE THAN USUAL: NOT AT ALL
SUM OF ALL RESPONSES TO PHQ QUESTIONS 1-9: 5
SUM OF ALL RESPONSES TO PHQ QUESTIONS 1-9: 5
5. POOR APPETITE OR OVEREATING: NOT AT ALL
7. TROUBLE CONCENTRATING ON THINGS, SUCH AS READING THE NEWSPAPER OR WATCHING TELEVISION: SEVERAL DAYS
4. FEELING TIRED OR HAVING LITTLE ENERGY: MORE THAN HALF THE DAYS
10. IF YOU CHECKED OFF ANY PROBLEMS, HOW DIFFICULT HAVE THESE PROBLEMS MADE IT FOR YOU TO DO YOUR WORK, TAKE CARE OF THINGS AT HOME, OR GET ALONG WITH OTHER PEOPLE: NOT DIFFICULT AT ALL
9. THOUGHTS THAT YOU WOULD BE BETTER OFF DEAD, OR OF HURTING YOURSELF: NOT AT ALL

## 2024-09-26 ASSESSMENT — ANXIETY QUESTIONNAIRES
6. BECOMING EASILY ANNOYED OR IRRITABLE: MORE THAN HALF THE DAYS
3. WORRYING TOO MUCH ABOUT DIFFERENT THINGS: SEVERAL DAYS
5. BEING SO RESTLESS THAT IT IS HARD TO SIT STILL: NOT AT ALL
7. FEELING AFRAID AS IF SOMETHING AWFUL MIGHT HAPPEN: NOT AT ALL
2. NOT BEING ABLE TO STOP OR CONTROL WORRYING: SEVERAL DAYS
GAD7 TOTAL SCORE: 5
4. TROUBLE RELAXING: NOT AT ALL
1. FEELING NERVOUS, ANXIOUS, OR ON EDGE: SEVERAL DAYS
IF YOU CHECKED OFF ANY PROBLEMS ON THIS QUESTIONNAIRE, HOW DIFFICULT HAVE THESE PROBLEMS MADE IT FOR YOU TO DO YOUR WORK, TAKE CARE OF THINGS AT HOME, OR GET ALONG WITH OTHER PEOPLE: SOMEWHAT DIFFICULT

## 2025-06-29 ENCOUNTER — OFFICE VISIT (OUTPATIENT)
Age: 25
End: 2025-06-29

## 2025-06-29 VITALS
BODY MASS INDEX: 24.45 KG/M2 | SYSTOLIC BLOOD PRESSURE: 127 MMHG | HEART RATE: 72 BPM | WEIGHT: 217 LBS | DIASTOLIC BLOOD PRESSURE: 80 MMHG | RESPIRATION RATE: 16 BRPM | OXYGEN SATURATION: 99 % | TEMPERATURE: 98.3 F

## 2025-06-29 DIAGNOSIS — L30.9 DERMATITIS: Primary | ICD-10-CM

## 2025-06-29 RX ORDER — TRIAMCINOLONE ACETONIDE 1 MG/G
OINTMENT TOPICAL 2 TIMES DAILY
Qty: 30 G | Refills: 0 | Status: SHIPPED | OUTPATIENT
Start: 2025-06-29 | End: 2025-07-06

## 2025-06-29 RX ORDER — HYDROCODONE BITARTRATE AND ACETAMINOPHEN 5; 325 MG/1; MG/1
1 TABLET ORAL EVERY 6 HOURS PRN
COMMUNITY
Start: 2025-06-25